# Patient Record
Sex: MALE | Race: OTHER | HISPANIC OR LATINO
[De-identification: names, ages, dates, MRNs, and addresses within clinical notes are randomized per-mention and may not be internally consistent; named-entity substitution may affect disease eponyms.]

---

## 2022-11-17 PROBLEM — Z00.00 ENCOUNTER FOR PREVENTIVE HEALTH EXAMINATION: Status: ACTIVE | Noted: 2022-11-17

## 2023-01-03 ENCOUNTER — APPOINTMENT (OUTPATIENT)
Dept: HEPATOLOGY | Facility: CLINIC | Age: 68
End: 2023-01-03

## 2023-01-24 ENCOUNTER — APPOINTMENT (OUTPATIENT)
Dept: HEPATOLOGY | Facility: CLINIC | Age: 68
End: 2023-01-24

## 2023-03-02 ENCOUNTER — APPOINTMENT (OUTPATIENT)
Dept: HEPATOLOGY | Facility: CLINIC | Age: 68
End: 2023-03-02

## 2023-03-16 ENCOUNTER — APPOINTMENT (OUTPATIENT)
Dept: HEPATOLOGY | Facility: CLINIC | Age: 68
End: 2023-03-16

## 2024-02-09 ENCOUNTER — APPOINTMENT (OUTPATIENT)
Dept: HEPATOLOGY | Facility: CLINIC | Age: 69
End: 2024-02-09

## 2024-03-05 ENCOUNTER — APPOINTMENT (OUTPATIENT)
Dept: HEPATOLOGY | Facility: CLINIC | Age: 69
End: 2024-03-05
Payer: MEDICARE

## 2024-03-05 VITALS
OXYGEN SATURATION: 97 % | RESPIRATION RATE: 14 BRPM | DIASTOLIC BLOOD PRESSURE: 68 MMHG | WEIGHT: 183 LBS | SYSTOLIC BLOOD PRESSURE: 133 MMHG | HEIGHT: 67 IN | TEMPERATURE: 98.7 F | BODY MASS INDEX: 28.72 KG/M2 | HEART RATE: 74 BPM

## 2024-03-05 DIAGNOSIS — Z87.891 PERSONAL HISTORY OF NICOTINE DEPENDENCE: ICD-10-CM

## 2024-03-05 DIAGNOSIS — Z87.898 PERSONAL HISTORY OF OTHER SPECIFIED CONDITIONS: ICD-10-CM

## 2024-03-05 DIAGNOSIS — J98.4 OTHER DISORDERS OF LUNG: ICD-10-CM

## 2024-03-05 DIAGNOSIS — E11.9 TYPE 2 DIABETES MELLITUS W/OUT COMPLICATIONS: ICD-10-CM

## 2024-03-05 PROCEDURE — 99205 OFFICE O/P NEW HI 60 MIN: CPT

## 2024-03-05 RX ORDER — GLIMEPIRIDE 4 MG/1
4 TABLET ORAL
Refills: 0 | Status: ACTIVE | COMMUNITY

## 2024-03-05 RX ORDER — METFORMIN HYDROCHLORIDE 1000 MG/1
1000 TABLET, COATED ORAL
Refills: 0 | Status: ACTIVE | COMMUNITY

## 2024-03-05 RX ORDER — SIMVASTATIN 5 MG/1
5 TABLET, FILM COATED ORAL
Refills: 0 | Status: ACTIVE | COMMUNITY

## 2024-03-05 RX ORDER — LISINOPRIL 5 MG/1
5 TABLET ORAL
Refills: 0 | Status: ACTIVE | COMMUNITY

## 2024-03-11 ENCOUNTER — APPOINTMENT (OUTPATIENT)
Dept: HEPATOLOGY | Facility: CLINIC | Age: 69
End: 2024-03-11
Payer: MEDICARE

## 2024-03-11 PROCEDURE — 76981 USE PARENCHYMA: CPT

## 2024-03-18 NOTE — ASSESSMENT
[FreeTextEntry1] : 69 yo Pakistani speaking, overweight (BMI28) Male from [facility], w/ Hx of Type 2 DM (Dx 2022, on OAD), HLD (simvastatin), uncertain lung disease ("allergy"), pos KARISHMA and RF, thrombocytopenia and cirrhosis presumably due to AUD (age [age], quit around 2019), w/ CSPH, but without any prior decompensation per patient.   Cirrhosis without any signs or symptoms of decompensation. - Will get MELD labs - I have explained and educated pt at length the natural history of cirrhosis, complications of cirrhosis with portal hypertension, esophageal varices with bleeding, portosystemic encephalopathy, ascites and infection, and  risk of HCC. - No ascites on physical exam and on US abdomen, no hx of SBP in past , not on SBP prophylaxis, not on diuretics. Normal BUN/Cr. - No signs of overt hepatic encephalopathy. - Esophageal varix (EV) screening: Will get CBC, LSM, but likely will need EV screening - HCC screening: Ordered AFP and US abd.  - Ordered CLD BW.  Health maintenance: - Will check Hep serologies and will address bone health next visit - Counseled on liver healthy diet, including but not limited to avoiding alcohol, illicit drug, avoiding eating any unpasteurized dairy products; avoiding eating any raw or undercooked eggs, fish, poultry, or meat; and avoiding eating raw/steamed oysters or other shellfish to avoid risk of Vibrio infection.  Advised on avoiding use of herbal and dietary supplements due to potential hepatotoxicity, and limit use of acetaminophen to <2 grams per day. Advised on avoiding use of nonsteroidal antiinflammatory drugs (NSAIDs) as these can precipitate renal dysfunction in patients with advanced liver disease.    RTC 3 weeks w/ above resulrs

## 2024-03-18 NOTE — HISTORY OF PRESENT ILLNESS
[Alcohol Abuse] : alcohol abuse [Travel to Endemic Area] : travel to an endemic area [IV Drug Use] : no IV drug use [Infected Sexual Partner] : no infected sexual partner [Body Piercing] : no body piercing [Tattoo] : no tattoos [Hemodialysis] : no hemodialysis [Transfusion before 1992] : no transfusion before 1992 [Transplant before 1992] : no transplant before 1992 [Household Contact to HBV] : no household contact to HBV [Cocaine Use] : no cocaine use [de-identified] : Born in [facility]. Last travel 2/2024 [FreeTextEntry1] : Patient was offered free  services in her preferred language,  ID [id number].  69 yo Kyrgyz speaking Male w/ Hx of Type 2 DM (Dx 2022, on OAD, on metformin 1000 mg bid, glimepiride 4 mg/d, lisinopril 2.5 mg), HLD (simvastatin 5 mg), uncertain lung disease ("allergy", using albuterol, azelastine, fluticasone), was referred to hematology by PCP for thrombocytopenia (PLT ~ 100) noted in 2022, [facility] and was referred to liver clinic for cirrhosis, US abd 5/2022 showing enlarged, nodular, heterogenous liver w/ mild splenomegaly suggested cirrhosis w/ CSPH.  His cirrhosis suspected to be due to alcohol, between age 24 and 50, he has been daily drinking whiskey and marcela (10 drinks every day). He quit on his own in 2019 (2015 per A note) when he was hospitalized for liver disease. While denies prior Hx of ascites, he mentioned that his abdomen was growing when he got to the hospital and "went down" after he quit drinking.  He denies prior UGIB, no prior EGD, no prior HE.  Prior liver workup showed Hep A IgM neg, HBsAg neg, HBcAb IgM  neg, HCV ab neg, ceruloplasmin 21, IgG 1959, IgA 279, IgM 81, KARISHMA 1:640, HIV neg.  Labs 11/2023: INR 1.14, Na 139, K 4.2, AST 81, ALT 65, , bili 1.8, alb 4.1.  He had colonoscopy (~ 2022) which was reportedly c/b LGIB (?) requiring transfusion.   He is here for initial evaluation.  No recent BW or US. He had recent cold symptoms, sore throat, congestion, but had no fever or chills, took decongestant and improved.  He reports that getting injections desensitizing b/o his allergies.

## 2024-03-18 NOTE — REVIEW OF SYSTEMS
[Recent Weight Gain (___ Lbs)] : recent [unfilled] ~Ulb weight gain [Cough] : cough [Heartburn] : heartburn [As Noted in HPI] : as noted in HPI [Nocturia] : nocturia [Fever] : no fever [Chills] : no chills [Recent Weight Loss (___ Lbs)] : no recent weight loss [Chest Pain] : no chest pain [Shortness Of Breath] : no shortness of breath [Palpitations] : no palpitations [SOB on Exertion] : no shortness of breath during exertion [Abdominal Pain] : no abdominal pain [Vomiting] : no vomiting [Constipation] : no constipation [Diarrhea] : no diarrhea [Melena] : no melena [Dysuria] : no dysuria [Itching] : no itching [Confused] : no confusion [FreeTextEntry3] : wears eyes  [FreeTextEntry7] : Constipation only during his URI. No nausea. No hematochezia.   [FreeTextEntry6] : recent cold, improved   [FreeTextEntry9] : Hx of shoulder pain

## 2024-03-18 NOTE — PHYSICAL EXAM
[Spider Angioma] : No spider angioma(s) were observed [Scleral Icterus] : No Scleral Icterus [Non-Tender] : non-tender [Abdominal  Ascites] : no ascites [Jaundice] : No jaundice [Palmar Erythema] : no Palmar Erythema [Asterixis] : no asterixis observed [General Appearance - Alert] : alert [General Appearance - In No Acute Distress] : in no acute distress [Sclera] : the sclera and conjunctiva were normal [Oropharynx] : the oropharynx was normal [Neck Appearance] : the appearance of the neck was normal [Neck Cervical Mass (___cm)] : no neck mass was observed [Jugular Venous Distention Increased] : there was no jugular-venous distention [Thyroid Diffuse Enlargement] : the thyroid was not enlarged [Thyroid Nodule] : there were no palpable thyroid nodules [Auscultation Breath Sounds / Voice Sounds] : lungs were clear to auscultation bilaterally [Heart Sounds] : normal S1 and S2 [Heart Rate And Rhythm] : heart rate was normal and rhythm regular [Heart Sounds Gallop] : no gallops [Murmurs] : no murmurs [Heart Sounds Pericardial Friction Rub] : no pericardial rub [Edema] : there was no peripheral edema [Abdomen Soft] : soft [Bowel Sounds] : normal bowel sounds [] : no hepato-splenomegaly [Abdomen Tenderness] : non-tender [Abdomen Mass (___ Cm)] : no abdominal mass palpated [Cervical Lymph Nodes Enlarged Anterior Bilaterally] : anterior cervical [Cervical Lymph Nodes Enlarged Posterior Bilaterally] : posterior cervical [Axillary Lymph Nodes Enlarged Bilaterally] : axillary [Supraclavicular Lymph Nodes Enlarged Bilaterally] : supraclavicular [Femoral Lymph Nodes Enlarged Bilaterally] : femoral [Inguinal Lymph Nodes Enlarged Bilaterally] : inguinal [No Spinal Tenderness] : no spinal tenderness [No CVA Tenderness] : no ~M costovertebral angle tenderness [Abnormal Walk] : normal gait [Skin Color & Pigmentation] : normal skin color and pigmentation [FreeTextEntry1] : Grossly intact [Oriented To Time, Place, And Person] : oriented to person, place, and time [Affect] : the affect was normal [Impaired Insight] : insight and judgment were intact

## 2024-04-09 LAB
25(OH)D3 SERPL-MCNC: 62.6 NG/ML
A1AT SERPL-MCNC: 180 MG/DL
AFP-TM SERPL-MCNC: 4.7 NG/ML
ALBUMIN SERPL ELPH-MCNC: 4.2 G/DL
ALP BLD-CCNC: 210 IU/L
ALP BLD-CCNC: 215 U/L
ALP BONE CFR SERPL: 24 %
ALP BONE SERPL-MCNC: 32.6 UG/L
ALP INTEST CFR SERPL: 26 %
ALP LIVER CFR SERPL: 50 %
ALT SERPL-CCNC: 65 U/L
ANA PAT FLD IF-IMP: NORMAL
ANA SER IF-ACNC: ABNORMAL
ANION GAP SERPL CALC-SCNC: 13 MMOL/L
APTT BLD: 36.4 SEC
AST SERPL-CCNC: 86 U/L
BILIRUB DIRECT SERPL-MCNC: 0.5 MG/DL
BILIRUB INDIRECT SERPL-MCNC: 1 MG/DL
BILIRUB SERPL-MCNC: 1.5 MG/DL
BUN SERPL-MCNC: 11 MG/DL
CALCIUM SERPL-MCNC: 9.3 MG/DL
CANCER AG19-9 SERPL-ACNC: 60 U/ML
CEA SERPL-MCNC: 2.9 NG/ML
CHLORIDE SERPL-SCNC: 107 MMOL/L
CO2 SERPL-SCNC: 22 MMOL/L
CREAT SERPL-MCNC: 0.52 MG/DL
EGFR: 110 ML/MIN/1.73M2
ESTIMATED AVERAGE GLUCOSE: 143 MG/DL
FERRITIN SERPL-MCNC: 220 NG/ML
GGT SERPL-CCNC: 225 U/L
GLUCOSE SERPL-MCNC: 95 MG/DL
HBA1C MFR BLD HPLC: 6.6 %
HBV CORE IGG+IGM SER QL: NONREACTIVE
HBV SURFACE AB SER QL: NONREACTIVE
HBV SURFACE AG SER QL: NONREACTIVE
HCT VFR BLD CALC: 43.5 %
HEPATITIS A IGG ANTIBODY: REACTIVE
HGB BLD-MCNC: 14.2 G/DL
INR PPP: 1.06 RATIO
IRON SATN MFR SERPL: 40 %
IRON SERPL-MCNC: 137 UG/DL
LKM AB SER QL IF: <20.1 UNITS
MCHC RBC-ENTMCNC: 29.5 PG
MCHC RBC-ENTMCNC: 32.6 GM/DL
MCV RBC AUTO: 90.4 FL
MITOCHONDRIA AB SER IF-ACNC: NORMAL
PETH 16:0/18:1: NEGATIVE NG/ML
PETH 16:0/18:2: NEGATIVE NG/ML
PETH COMMENTS: NORMAL
PLATELET # BLD AUTO: 82 K/UL
POTASSIUM SERPL-SCNC: 4.2 MMOL/L
PROT SERPL-MCNC: 7.7 G/DL
PT BLD: 12.1 SEC
RBC # BLD: 4.81 M/UL
RBC # FLD: 14.1 %
SMOOTH MUSCLE AB SER QL IF: NORMAL
SODIUM SERPL-SCNC: 142 MMOL/L
TIBC SERPL-MCNC: 340 UG/DL
TSH SERPL-ACNC: 2.01 UIU/ML
TTG IGA SER IA-ACNC: <1.2 U/ML
TTG IGA SER-ACNC: NEGATIVE
UIBC SERPL-MCNC: 203 UG/DL
WBC # FLD AUTO: 3.63 K/UL

## 2024-05-28 ENCOUNTER — APPOINTMENT (OUTPATIENT)
Dept: HEPATOLOGY | Facility: CLINIC | Age: 69
End: 2024-05-28
Payer: MEDICARE

## 2024-05-28 ENCOUNTER — NON-APPOINTMENT (OUTPATIENT)
Age: 69
End: 2024-05-28

## 2024-05-28 VITALS
TEMPERATURE: 98.2 F | HEIGHT: 67 IN | OXYGEN SATURATION: 96 % | BODY MASS INDEX: 29.82 KG/M2 | HEART RATE: 78 BPM | RESPIRATION RATE: 14 BRPM | DIASTOLIC BLOOD PRESSURE: 68 MMHG | WEIGHT: 190 LBS | SYSTOLIC BLOOD PRESSURE: 132 MMHG

## 2024-05-28 DIAGNOSIS — E78.5 HYPERLIPIDEMIA, UNSPECIFIED: ICD-10-CM

## 2024-05-28 DIAGNOSIS — R76.8 OTHER SPECIFIED ABNORMAL IMMUNOLOGICAL FINDINGS IN SERUM: ICD-10-CM

## 2024-05-28 PROCEDURE — 99214 OFFICE O/P EST MOD 30 MIN: CPT

## 2024-05-28 RX ORDER — CARVEDILOL 3.12 MG/1
3.12 TABLET, FILM COATED ORAL TWICE DAILY
Qty: 60 | Refills: 2 | Status: ACTIVE | COMMUNITY
Start: 2024-05-28 | End: 1900-01-01

## 2024-05-30 PROBLEM — E78.5 HYPERLIPIDEMIA, UNSPECIFIED HYPERLIPIDEMIA TYPE: Status: ACTIVE | Noted: 2024-03-05

## 2024-05-30 PROBLEM — R76.8 POSITIVE ANA (ANTINUCLEAR ANTIBODY): Status: ACTIVE | Noted: 2024-03-05

## 2024-05-30 LAB
AFP-TM SERPL-MCNC: 4.8 NG/ML
ALBUMIN SERPL ELPH-MCNC: 3.8 G/DL
ALP BLD-CCNC: 175 U/L
ALT SERPL-CCNC: 44 U/L
ANION GAP SERPL CALC-SCNC: 14 MMOL/L
AST SERPL-CCNC: 57 U/L
BILIRUB DIRECT SERPL-MCNC: 0.5 MG/DL
BILIRUB INDIRECT SERPL-MCNC: 0.9 MG/DL
BILIRUB SERPL-MCNC: 1.4 MG/DL
BUN SERPL-MCNC: 11 MG/DL
CALCIUM SERPL-MCNC: 8.8 MG/DL
CANCER AG19-9 SERPL-ACNC: 54 U/ML
CEA SERPL-MCNC: 2.6 NG/ML
CHLORIDE SERPL-SCNC: 108 MMOL/L
CO2 SERPL-SCNC: 19 MMOL/L
CREAT SERPL-MCNC: 0.54 MG/DL
EGFR: 109 ML/MIN/1.73M2
GLUCOSE SERPL-MCNC: 215 MG/DL
HCT VFR BLD CALC: 39.9 %
HGB BLD-MCNC: 13.1 G/DL
INR PPP: 1.14 RATIO
MCHC RBC-ENTMCNC: 29.9 PG
MCHC RBC-ENTMCNC: 32.8 GM/DL
MCV RBC AUTO: 91.1 FL
PLATELET # BLD AUTO: NORMAL K/UL
POTASSIUM SERPL-SCNC: 3.9 MMOL/L
PROT SERPL-MCNC: 6.6 G/DL
PT BLD: 12.8 SEC
RBC # BLD: 4.38 M/UL
RBC # FLD: 14.6 %
SODIUM SERPL-SCNC: 140 MMOL/L
WBC # FLD AUTO: 3.5 K/UL

## 2024-05-30 NOTE — REVIEW OF SYSTEMS
[Recent Weight Gain (___ Lbs)] : recent [unfilled] ~Ulb weight gain [Cough] : cough [Heartburn] : heartburn [Nocturia] : nocturia [As Noted in HPI] : as noted in HPI [Fever] : no fever [Chills] : no chills [Recent Weight Loss (___ Lbs)] : no recent weight loss [Chest Pain] : no chest pain [Palpitations] : no palpitations [Shortness Of Breath] : no shortness of breath [SOB on Exertion] : no shortness of breath during exertion [Abdominal Pain] : no abdominal pain [Vomiting] : no vomiting [Constipation] : no constipation [Diarrhea] : no diarrhea [Melena] : no melena [Dysuria] : no dysuria [Itching] : no itching [Confused] : no confusion [FreeTextEntry3] : wears eyes  [FreeTextEntry6] : recent cold, improved   [FreeTextEntry7] : Constipation only during his URI. No nausea. No hematochezia.   [FreeTextEntry9] : Hx of shoulder pain

## 2024-05-30 NOTE — HISTORY OF PRESENT ILLNESS
[Alcohol Abuse] : alcohol abuse [Travel to Endemic Area] : travel to an endemic area [Infected Sexual Partner] : no infected sexual partner [IV Drug Use] : no IV drug use [Body Piercing] : no body piercing [Tattoo] : no tattoos [Hemodialysis] : no hemodialysis [Transfusion before 1992] : no transfusion before 1992 [Transplant before 1992] : no transplant before 1992 [Household Contact to HBV] : no household contact to HBV [Cocaine Use] : no cocaine use [de-identified] : Born in [facility]. Last travel 2/2024 [FreeTextEntry1] : Patient was offered free  services in her preferred language,  ID [id number] and [id number].   67 yo Icelandic speaking Male w/ Hx of Type 2 DM (Dx 2022, on OAD, on metformin 1000 mg bid, glimepiride 4 mg/d, lisinopril 2.5 mg), HLD (simvastatin 5 mg), uncertain lung disease ("allergy", using albuterol, azelastine, fluticasone), was referred to hematology by PCP for thrombocytopenia (PLT ~ 100) noted in 2022, was seen at [facility] by Dr. Blackburn and was referred to liver clinic for cirrhosis, US abd 5/2022 showing enlarged, nodular, heterogenous liver w/ mild splenomegaly suggested cirrhosis w/ CSPH.  His cirrhosis suspected to be due to alcohol, between age 24 and 50, he has been daily drinking whiskey and marcela (10 drinks every day). He quit on his own in 2019 (2015 per A note) when he was hospitalized for liver disease. While denies prior Hx of ascites, he mentioned that his abdomen was growing when he got to the hospital and "went down" after he quit drinking.  He denies prior UGIB, no prior EGD, no prior HE.  Prior liver workup showed Hep A IgM neg, HBsAg neg, HBcAb IgM  neg, HCV ab neg, ceruloplasmin 21, IgG 1959, IgA 279, IgM 81, KARISHMA 1:640, HIV neg.  Labs 11/2023: INR 1.14, Na 139, K 4.2, AST 81, ALT 65, , bili 1.8, alb 4.1.  He had colonoscopy (~ 2022) which was reportedly c/b LGIB (?) requiring transfusion.   He was seen for initial evaluation 3/5/24 when had no recent BW or US. He had recent cold symptoms, sore throat, congestion, but had no fever or chills, took decongestant and improved.  He reported that getting injections desensitizing b/o his allergies.   He is here for follow up. No change in medications. No new symptoms.  Fibroscan 3/11/24 showed no steatosis, but  LSM  was > 25 kPA in line w/ cirrhosis w/ CSPH.

## 2024-05-30 NOTE — ASSESSMENT
[FreeTextEntry1] : 69 yo Icelandic speaking, overweight (BMI28) Male from [facility], w/ Hx of Type 2 DM (Dx 2022, on OAD), HLD (simvastatin), uncertain lung disease ("allergy"), pos KARISHMA and RF, thrombocytopenia and cirrhosis presumably due to AUD (age [age], quit around 2019), w/ CSPH, but without any prior decompensation per patient.   Cirrhosis without any signs or symptoms of decompensation., MELD 3.0 8 - Will get repeat MELD labs - I have explained and educated pt at length the natural history of cirrhosis, complications of cirrhosis with portal hypertension, esophageal varices with bleeding, portosystemic encephalopathy, ascites and infection, and  risk of HCC. - No ascites on physical exam and on US abdomen, no hx of SBP in past, not on SBP prophylaxis, not on diuretics. Normal BUN/Cr. - No signs of overt hepatic encephalopathy. - Esophageal varix (EV) screening: LSM > 25 kPa suggests CSPH, thus starting on carvedilol 3.125 mg bid, and referred to GI for EV screening - HCC screening q 6 months: No focal hepatic lesion on US abd 3/25/24, AFP WNL 3/2024. Will repeat 9/2024. (B/o GB polyps, will get MR/MRCP now.) - Reviewed CLD BW. KARISHMA pos but transaminases close to normal thus active AIH less likely, will monitor, get Ig level and refer to rheumatology for pos KARISHMA  Health maintenance: -  Hep A immune - Hep B neg, not immune, not exposed - advised Hep B vaccine, patient to schedule via  or  via PCP - Will address bone health next visit - Counseled on liver healthy diet, including but not limited to avoiding alcohol, illicit drug, avoiding eating any unpasteurized dairy products; avoiding eating any raw or undercooked eggs, fish, poultry, or meat; and avoiding eating raw/steamed oysters or other shellfish to avoid risk of Vibrio infection.  Advised on avoiding use of herbal and dietary supplements due to potential hepatotoxicity, and limit use of acetaminophen to <2 grams per day. Advised on avoiding use of nonsteroidal anti-inflammatory drugs (NSAIDs) as these can precipitate renal dysfunction in patients with advanced liver disease.    RTC after MRCP (3-4 weeks latest)

## 2024-05-30 NOTE — PHYSICAL EXAM
[Non-Tender] : non-tender [General Appearance - Alert] : alert [General Appearance - In No Acute Distress] : in no acute distress [Sclera] : the sclera and conjunctiva were normal [Oropharynx] : the oropharynx was normal [Neck Appearance] : the appearance of the neck was normal [Neck Cervical Mass (___cm)] : no neck mass was observed [Jugular Venous Distention Increased] : there was no jugular-venous distention [Thyroid Diffuse Enlargement] : the thyroid was not enlarged [Thyroid Nodule] : there were no palpable thyroid nodules [Auscultation Breath Sounds / Voice Sounds] : lungs were clear to auscultation bilaterally [Heart Rate And Rhythm] : heart rate was normal and rhythm regular [Heart Sounds] : normal S1 and S2 [Heart Sounds Gallop] : no gallops [Murmurs] : no murmurs [Heart Sounds Pericardial Friction Rub] : no pericardial rub [Edema] : there was no peripheral edema [Bowel Sounds] : normal bowel sounds [Abdomen Soft] : soft [Abdomen Tenderness] : non-tender [] : no hepato-splenomegaly [Abdomen Mass (___ Cm)] : no abdominal mass palpated [Cervical Lymph Nodes Enlarged Posterior Bilaterally] : posterior cervical [Cervical Lymph Nodes Enlarged Anterior Bilaterally] : anterior cervical [Supraclavicular Lymph Nodes Enlarged Bilaterally] : supraclavicular [Axillary Lymph Nodes Enlarged Bilaterally] : axillary [Femoral Lymph Nodes Enlarged Bilaterally] : femoral [Inguinal Lymph Nodes Enlarged Bilaterally] : inguinal [No CVA Tenderness] : no ~M costovertebral angle tenderness [No Spinal Tenderness] : no spinal tenderness [Abnormal Walk] : normal gait [Skin Color & Pigmentation] : normal skin color and pigmentation [Oriented To Time, Place, And Person] : oriented to person, place, and time [Impaired Insight] : insight and judgment were intact [Affect] : the affect was normal [Scleral Icterus] : No Scleral Icterus [Spider Angioma] : No spider angioma(s) were observed [Abdominal  Ascites] : no ascites [Asterixis] : no asterixis observed [Jaundice] : No jaundice [Palmar Erythema] : no Palmar Erythema [FreeTextEntry1] : Grossly intact

## 2024-06-05 ENCOUNTER — APPOINTMENT (OUTPATIENT)
Dept: GASTROENTEROLOGY | Facility: CLINIC | Age: 69
End: 2024-06-05
Payer: MEDICARE

## 2024-06-05 VITALS
HEART RATE: 63 BPM | OXYGEN SATURATION: 95 % | BODY MASS INDEX: 29.03 KG/M2 | DIASTOLIC BLOOD PRESSURE: 70 MMHG | HEIGHT: 67 IN | SYSTOLIC BLOOD PRESSURE: 129 MMHG | WEIGHT: 185 LBS | TEMPERATURE: 97.3 F

## 2024-06-05 DIAGNOSIS — R10.13 EPIGASTRIC PAIN: ICD-10-CM

## 2024-06-05 DIAGNOSIS — K82.4 CHOLESTEROLOSIS OF GALLBLADDER: ICD-10-CM

## 2024-06-05 DIAGNOSIS — Z86.010 PERSONAL HISTORY OF COLONIC POLYPS: ICD-10-CM

## 2024-06-05 DIAGNOSIS — K74.60 UNSPECIFIED CIRRHOSIS OF LIVER: ICD-10-CM

## 2024-06-05 DIAGNOSIS — K21.9 GASTRO-ESOPHAGEAL REFLUX DISEASE W/OUT ESOPHAGITIS: ICD-10-CM

## 2024-06-05 DIAGNOSIS — D69.6 THROMBOCYTOPENIA, UNSPECIFIED: ICD-10-CM

## 2024-06-05 DIAGNOSIS — R19.7 DIARRHEA, UNSPECIFIED: ICD-10-CM

## 2024-06-05 PROCEDURE — 99204 OFFICE O/P NEW MOD 45 MIN: CPT

## 2024-06-05 RX ORDER — SIMETHICONE 125 MG/1
125 TABLET, CHEWABLE ORAL
Qty: 120 | Refills: 3 | Status: ACTIVE | COMMUNITY
Start: 2024-06-05 | End: 1900-01-01

## 2024-06-05 RX ORDER — FAMOTIDINE 40 MG/1
40 TABLET, FILM COATED ORAL
Qty: 60 | Refills: 3 | Status: ACTIVE | COMMUNITY
Start: 2024-06-05 | End: 1900-01-01

## 2024-06-06 NOTE — HISTORY OF PRESENT ILLNESS
[FreeTextEntry1] : The patient is a 68-year-old  male with past medical history significant for hypertension, hypercholesterolemia, diabetes mellitus and ETOH liver cirrhosis being followed by his hepatologist, Dr. Jaci Mendoza who was referred to my office by Dr. Ezra Mckeon for evaluation for esophageal varices. The patient also admits to having dyspepsia, gastroesophageal reflux disease, diarrhea, change in bowel habits and change in caliber of stool.  The patient also admits to a history of colonic polyps.  I was asked to render an opinion for consultation for the above complaints.   The patient states that he is feeling fine.  The patient has a history of liver disease.  The patient has a history of fatty liver noted on prior imaging study. The patient denies any prior exposure to hepatitis A, B or C.  The patient denies any large doses of nonsteroidal anti-inflammatory drugs or acetaminophen.   The patient denies sharing needles, razors, nail clippers, nail files, scissors, et cetera.  The patient admits to EtOH abuse but denies any cocaine use and intravenous drug use.  The patient denies any prior sexual indiscretions, tattoos or piercing.  The patient admits to having prior surgery.  The history of surgery is significant for a prior bilateral hernia repair.  The patient admits to a prior blood transfusion at [facility] after colon polypectomy in the past in 2020.  The patient denies any family history of GI or liver problems.  The patient denies any abdominal pain.  The patient complains of abdominal gas and bloating.  The patient denies any nausea or vomiting.  The patient complains of occasional gastroesophageal reflux disease but denies any dysphagia. The gastroesophageal reflux disease is worse after meals and late at night and in the early morning.  The patient denies taking medications for the gastroesophageal reflux disease. The patient denies any atypical chest pain, shortness of breath or palpitations.  The patient denies any diaphoresis. The patient complains of occasional diarrhea but denies any constipation.  The patient has 1 to 2 bowel movements a day. The diarrhea is described as soft to watery in nature.   The patient complains of a change in bowel habits.  The patient complains of a change in caliber of stool.  The patient denies having mucus discharge with the bowel movements.  The patient denies any bright red blood per rectum, melena or hematemesis.  The patient denies any rectal pain or rectal pruritus. The patient denies any weight loss or anorexia.  He denies any fevers or chills.  The patient denies any jaundice or pruritus.  The patient denies any back pain. The blood work performed on May 28, 2024 revealed a low WBC count of 3.50 K/UL, no evidence of anemia with a hemoglobin/hematocrit level of 13.1/39.9, respectively, platelets clumped, a normal PT/INR of 12.8/1.14, respectively, a normal alpha-fetoprotein level of 4.8 ng/mL, a normal CEA level of 2.6 ng/mL, and elevated CA 19-9 of 54 U/mL, elevated liver enzymes with an alkaline phosphatase/AST of  175/57 U/L, respectively, and elevated total bilirubin of 1.4 mg/dL, a normal ALT of 44 U/L, a low CO2 of 19 mmol/L, and elevated blood glucose of 215 mg/dL.  The platelet count automated was 86,000.  The abdominal ultrasound performed on March 25, 2024 revealed cirrhotic liver, mildly contracted gallbladder containing few small gallbladder polyps, and enlarged spleen small amount of free fluid seen adjacent to the liver. The patient admits to having a prior colonoscopy performed by another gastroenterologist.  According to the patient, the colonoscopic findings revealed colon polyps.   The patient denies any significant family history of GI problems.    (+) prior smoking 1 PPW x 30 years stopped x 4 years, (+) prior ETOH x 4 years, (-) IVDA  Physical Exam: General Appearance: Overweight, no acute distress ENT:  nose clear, ears unremarkable Eyes: No enteric sclera, conjunctiva clear. Neck: Supple, without masses  Respiratory: Breath sounds equal and bilateral, no wheezing no rales or rhonchi Cardiovascular: S1-S2 audible, no murmur, no rubs or gallops GI: (+) BS, soft, nontender, no rebound, no guarding, no masses Liver: liver edge palpated Rectal: not done Musculo-skeletal: Good motor strength, good range of motion, normal appearing extremities Skin: Normal appearing skin, no jaundice, no rashes or nodules Neurological: without focal motor or sensory deficits Patient is moving all extremities spontaneously and to command with normal muscle strength alert and oriented X3 Psychiatric: Good affect, not depressed, not anxious    [de-identified] : The abdominal ultrasound performed on March 25, 2024 revealed cirrhotic liver, mildly contracted gallbladder containing few small gallbladder polyps, and enlarged spleen small amount of free fluid seen adjacent to the liver.

## 2024-06-06 NOTE — ASSESSMENT
[FreeTextEntry1] : Dyspepsia: The patient complains of dyspeptic symptoms.  The patient was advised to abide by an anti-gas (low FOD-MAP) diet.  The patient was given a pamphlet for anti-gas (low FOD-MAP).  The patient and I reviewed the anti-gas (low FOD-MAP) diet at length. The patient is to start on a trial of Simethicone one tablet 4 times a day p.r.n. abdominal pain and gas. GERD: The patient was advised to avoid late-night meals and dietary indiscretions.  The patient was advised to avoid fried and fatty foods.  The patient was advised to abide by an anti-GERD diet. The patient was given a pamphlet for anti-GERD.  The patient and I reviewed the anti-GERD diet at length. I recommend a trial of famotidine 40 mg twice a day x 3 months for the symptoms. Diarrhea: The patient complains of diarrhea.  I recommend a low residue diet. The patient is to avoid fiber supplementation. The patient is to consider starting a trial of a probiotic such as Align once a day.  re.  The patient was told of the risks of perforation, emergency surgery, bleeding, infections and missed lesions.  The patient agreed and will follow-up to reassess the symptoms.   Prior Endoscopic Procedures: The patient had a prior colonoscopy performed by another gastroenterologist.  I will try to obtain the prior colonoscopy reports.  The patient is to sign a record release to obtain those records. ETOH Liver Cirrhosis: The patient had a history of ETOH abuse.  The patient denies any further alcohol use.  The patient denies any jaundice or pruritus. The patient denies taking large doses of nonsteroidal anti-inflammatory drugs or acetaminophen.  The findings are secondary to EtOH liver cirrhosis. The patient was told of the possible increased risk of developing liver failure, worsening cirrhosis, ascites, GI bleeding secondary to varices, hepatic encephalopathy, bleeding tendencies and liver cancer.  The patient was told of the importance of follow-up.  The patient was advised to follow up every 6 months for blood work and imaging studies. The patient agreed and will follow up. The patient was also advised to avoid NSAIDs, Acetaminophen and any other hepatotoxic drugs. The patient was also advised not to share needles, razors, scissors, nail clippers, etc.. The patient is to continue close follow-up in our office for blood work and exams.  The patient and I had a long discussion regarding the importance of abstinence of alcohol because of the risk of cirrhosis and complications. The patient is aware of the risks of further progression of liver disease while drinking alcohol or taking hepato-toxic agents.  The patient agrees and will continue to abstain from alcohol.  The patient is to continue followup with his hepatologist Dr. Mendoza. R/O Esophageal Varices: The patient was found to have findings on imaging study was suggestive of liver cirrhosis. I recommend an upper endoscopy to assess for esophageal varices and possible esophageal band ligation.  The patient was told of the increased risk of esophageal variceal bleeding in the future.  I recommend an upper endoscopy to assess the esophageal varices for possible band ligation.  The patient was told of the risks and benefits of the procedure.  The patient was told of the risks of perforation, emergency surgery, bleeding, infections and missed lesions. The patient agreed and will schedule for procedure. The patient is to be n.p.o. after midnight.  I also recommend a prophylactic dose of Cipro 500 mg one hour prior to the procedure and 6 hours after the procedure.  The patient is to return for the procedure.  Gallbladder Polyps: The patient has gallbladder polyps noted on abdominal ultrasound.  The patient and I had a long discussion regarding the risks of gallbladder polyps progressing to gallbladder cancer if the size is greater than 1cm.  The patient was told of the importance for follow-up for serial abdominal ultrasounds.  The patient was told of the possible need for a cholecystectomy if the gallbladder polyps are greater than 1cm.  The patient agreed and will follow-up.  I recommend a repeat abdominal ultrasound in 6 months to reassess the gallbladder polyp size. History of Colonic Polyps: The patient has a history of colonic polyps.  I recommend a repeat colonoscopy in 1 year to reassess for colonic polyps unless symptomatic.  The patient agreed and will follow up for the procedure.  Upper Endoscopy: I recommend an upper endoscopy to assess for peptic ulcer disease versus esophagitis.  The patient was told of the risks and benefits of the procedure.  The patient was told of the risks of perforation, emergency surgery, bleeding, infections and missed lesions. The patient is told not to drive, drink alcohol, use recreational drugs, exercise, or work the day of the procedure.  The patient was told of the need for an escort to accompany the patient home after the procedure. The patient is aware that the procedure may be cancelled if they fail to follow the directions.  The patient agreed and will schedule for the procedure. The patient can take the antihypertensive medication with a sip of water one hour prior to the procedure. The patient is to hold the diabetic medication the day the morning of the procedure. The patient is to be n.p.o. after midnight.  The patient is to return for the procedure. Thrombocytopenia: The patient was found to have thrombocytopenia on recent blood work.  The thrombocytopenia is most likely secondary to cirrhosis and splenomegaly.  The patient denies any active GI bleeding.  I recommend  following the platelet count and treat accordingly. Follow-up: The patient is to follow-up in the office in 4 weeks to reassess the symptoms. The patient was told to call the office if any further problems.

## 2024-06-11 LAB
DEPRECATED KAPPA LC FREE/LAMBDA SER: 1.17 RATIO
IGA SER QL IEP: 256 MG/DL
IGG SER QL IEP: 1575 MG/DL
IGM SER QL IEP: 88 MG/DL
KAPPA LC CSF-MCNC: 2.45 MG/DL
KAPPA LC SERPL-MCNC: 2.87 MG/DL
PETH 16:0/18:1: NEGATIVE NG/ML
PETH 16:0/18:2: NEGATIVE NG/ML
PETH COMMENTS: NORMAL
PLATELET # BLD AUTO: 86 K/UL
PLATELET # PLAS AUTO: NORMAL

## 2024-06-19 ENCOUNTER — APPOINTMENT (OUTPATIENT)
Dept: MRI IMAGING | Facility: CLINIC | Age: 69
End: 2024-06-19

## 2024-09-02 ENCOUNTER — RX RENEWAL (OUTPATIENT)
Age: 69
End: 2024-09-02

## 2024-09-17 ENCOUNTER — APPOINTMENT (OUTPATIENT)
Dept: GASTROENTEROLOGY | Facility: HOSPITAL | Age: 69
End: 2024-09-17

## 2024-10-16 ENCOUNTER — APPOINTMENT (OUTPATIENT)
Dept: GASTROENTEROLOGY | Facility: CLINIC | Age: 69
End: 2024-10-16

## 2024-10-22 ENCOUNTER — OUTPATIENT (OUTPATIENT)
Dept: OUTPATIENT SERVICES | Facility: HOSPITAL | Age: 69
LOS: 1 days | End: 2024-10-22
Payer: COMMERCIAL

## 2024-10-22 ENCOUNTER — TRANSCRIPTION ENCOUNTER (OUTPATIENT)
Age: 69
End: 2024-10-22

## 2024-10-22 ENCOUNTER — APPOINTMENT (OUTPATIENT)
Dept: GASTROENTEROLOGY | Facility: HOSPITAL | Age: 69
End: 2024-10-22

## 2024-10-22 VITALS
TEMPERATURE: 98 F | RESPIRATION RATE: 12 BRPM | SYSTOLIC BLOOD PRESSURE: 125 MMHG | HEIGHT: 67 IN | DIASTOLIC BLOOD PRESSURE: 60 MMHG | OXYGEN SATURATION: 98 % | HEART RATE: 58 BPM | WEIGHT: 184.97 LBS

## 2024-10-22 VITALS
DIASTOLIC BLOOD PRESSURE: 76 MMHG | HEART RATE: 71 BPM | SYSTOLIC BLOOD PRESSURE: 137 MMHG | RESPIRATION RATE: 22 BRPM | OXYGEN SATURATION: 96 %

## 2024-10-22 DIAGNOSIS — K74.60 UNSPECIFIED CIRRHOSIS OF LIVER: ICD-10-CM

## 2024-10-22 DIAGNOSIS — Z98.890 OTHER SPECIFIED POSTPROCEDURAL STATES: Chronic | ICD-10-CM

## 2024-10-22 LAB — GLUCOSE BLDC GLUCOMTR-MCNC: 151 MG/DL — HIGH (ref 70–99)

## 2024-10-22 PROCEDURE — C1889: CPT

## 2024-10-22 PROCEDURE — 43244 EGD VARICES LIGATION: CPT

## 2024-10-22 PROCEDURE — 43235 EGD DIAGNOSTIC BRUSH WASH: CPT

## 2024-10-22 PROCEDURE — 82962 GLUCOSE BLOOD TEST: CPT

## 2024-10-22 DEVICE — SPEEDBAND SPRVW 7: Type: IMPLANTABLE DEVICE | Status: FUNCTIONAL

## 2024-10-22 RX ORDER — SODIUM CHLORIDE 9 MG/ML
500 INJECTION, SOLUTION INTRAMUSCULAR; INTRAVENOUS; SUBCUTANEOUS
Refills: 0 | Status: COMPLETED | OUTPATIENT
Start: 2024-10-22 | End: 2024-10-22

## 2024-10-22 RX ADMIN — SODIUM CHLORIDE 30 MILLILITER(S): 9 INJECTION, SOLUTION INTRAMUSCULAR; INTRAVENOUS; SUBCUTANEOUS at 11:27

## 2024-10-22 NOTE — CHART NOTE - NSCHARTNOTEFT_GEN_A_CORE
History of Present Illness       The patient is a 69-year-old  male with past medical history significant for hypertension, hypercholesterolemia, diabetes mellitus and ETOH liver cirrhosis being followed by his hepatologist, Dr. Jaci Mendoza who was referred to my office by Dr. Ezra Mckeon for evaluation for esophageal varices. The patient also admits to having dyspepsia, gastroesophageal reflux disease, diarrhea, change in bowel habits and change in caliber of stool. The patient also admits to a history of colonic polyps  I was asked to render an opinion for consultation for the above complaints. The patient states that he is feeling fine. The patient has a history of liver disease. The patient has a history of fatty liver noted on prior imaging study. The patient denies any prior exposure to hepatitis A, B or C. The patient denies any large doses of nonsteroidal anti-inflammatory drugs or acetaminophen. The patient denies sharing needles, razors, nail clippers, nail files, scissors, et cetera. The patient admits to EtOH abuse but denies any cocaine use and intravenous drug use. The patient denies any prior sexual indiscretions, tattoos or piercing. The patient admits to having prior surgery. The history of surgery is significant for a prior bilateral hernia repair. The patient admits to a prior blood transfusion at [facility] after colon polypectomy in the past in 2020. The patient denies any family history of GI or liver problems. The patient denies any abdominal pain. The patient complains of abdominal gas and bloating. The patient denies any nausea or vomiting. The patient complains of occasional gastroesophageal reflux disease but denies any dysphagia. The gastroesophageal reflux disease is worse after meals and late at night and in the early morning. The patient denies taking medications for the gastroesophageal reflux disease. The patient denies any atypical chest pain, shortness of breath or palpitations. The patient denies any diaphoresis. The patient complains of occasional diarrhea but denies any constipation. The patient has 1 to 2 bowel movements a day. The diarrhea is described as soft to watery in nature. The patient complains of a change in bowel habits. The patient complains of a change in caliber of stool. The patient denies having mucus discharge with the bowel movements. The patient denies any bright red blood per rectum, melena or hematemesis. The patient denies any rectal pain or rectal pruritus. The patient denies any weight loss or anorexia. He denies any fevers or chills. The patient denies any jaundice or pruritus. The patient denies any back pain. The blood work performed on May 28, 2024 revealed a low WBC count of 3.50 K/UL, no evidence of anemia with a hemoglobin/hematocrit level of 13.1/39.9, respectively, platelets clumped, a normal PT/INR of 12.8/1.14, respectively, a normal alpha-fetoprotein level of 4.8 ng/mL, a normal CEA level of 2.6 ng/mL, and elevated CA 19-9 of 54 U/mL, elevated liver enzymes with an alkaline phosphatase/AST of 175/57 U/L, respectively, and elevated total bilirubin of 1.4 mg/dL, a normal ALT of 44 U/L, a low CO2 of 19 mmol/L, and elevated blood glucose of 215 mg/dL. The platelet count automated was 86,000. The abdominal ultrasound performed on March 25, 2024 revealed cirrhotic liver, mildly contracted gallbladder containing few small gallbladder polyps, and enlarged spleen small amount of free fluid seen adjacent to the liver. The patient admits to having a prior colonoscopy performed by another gastroenterologist. According to the patient, the colonoscopic findings revealed colon polyps. The patient denies any significant family history of GI problems.  ?  (+) prior smoking 1 PPW x 30 years stopped x 4 years, (+) prior ETOH x 4 years, (-) IVDA  ?  Physical Exam:  General Appearance: Overweight, no acute distress  ENT: nose clear, ears unremarkable  Eyes: No enteric sclera, conjunctiva clear.  Neck: Supple, without masses  Respiratory: Breath sounds equal and bilateral, no wheezing no rales or rhonchi  Cardiovascular: S1-S2 audible, no murmur, no rubs or gallops  GI: (+) BS, soft, nontender, no rebound, no guarding, no masses  Liver: liver edge palpated  Rectal: not done  Musculo-skeletal: Good motor strength, good range of motion, normal appearing extremities  Skin: Normal appearing skin, no jaundice, no rashes or nodules  Neurological: without focal motor or sensory deficits Patient is moving all extremities spontaneously and to command with normal muscle strength alert and oriented X3  Psychiatric: Good affect, not depressed, not anxious      Radiology Summary    Abdominal Sono: The abdominal ultrasound performed on March 25, 2024 revealed cirrhotic liver, mildly contracted gallbladder containing few small gallbladder polyps, and enlarged spleen small amount of free fluid seen adjacent to the liver.  ?  Active Problems  Gall bladder polyp (575.6) (K82.4)  Hepatic cirrhosis, unspecified hepatic cirrhosis type, unspecified whether ascites  present (571.5) (K74.60)  Hyperlipidemia, unspecified hyperlipidemia type (272.4) (E78.5)  Lung disease (518.89) (J98.4)  Positive KARISHMA (antinuclear antibody) (795.79) (R76.8)  Thrombocytopenia (287.5) (D69.6)  Type 2 diabetes mellitus without complication, without long-term current use of insulin  (250.00) (E11.9)           ?  Social History  Former smoker (V15.82) (Z87.891)  History of alcohol use (V11.3) (Z87.898)           ?  Current Meds  Carvedilol 3.125 MG Oral Tablet; TAKE 1 TABLET TWICE DAILY WITH MEALS  Glimepiride 4 MG Oral Tablet  Lisinopril 5 MG Oral Tablet; TAKE 0.5 TABLET  metFORMIN HCl - 1000 MG Oral Tablet  Simvastatin 5 MG Oral Tablet           Allergies  No Known Drug Allergies           ?  Vitals  Vital Signs  ?  Recorded: 05Jun2024 08:50AM  Systolic  129, LUE, Sitting  Diastolic  70, LUE, Sitting  Height  5 ft 7 in  Weight  185 lb  BMI Calculated  28.98 kg/m2  BSA Calculated  1.96 m2  Temperature  97.3 F, Temporal  Heart Rate  63  O2 Saturation  95 %, Room Air  FiO2 Flow Rate  0 L/min, Room Air           ?  Assessment  Hepatic cirrhosis, unspecified hepatic cirrhosis type, unspecified whether ascites  present (571.5) (K74.60)  Thrombocytopenia (287.5) (D69.6)  History of colon polyps (V12.72) (Z86.010)  GERD without esophagitis (530.81) (K21.9)  Diarrhea, unspecified type (787.91) (R19.7)  Dyspepsia (536.8) (R10.13)  Gall bladder polyp (575.6) (K82.4)    Dyspepsia: The patient complains of dyspeptic symptoms. The patient was advised to abide by an anti-gas (low FOD-MAP) diet. The patient was given a pamphlet for anti-gas (low FOD-MAP). The patient and I reviewed the anti-gas (low FOD-MAP) diet at length. The patient is to start on a trial of Simethicone one tablet 4 times a day p.r.n. abdominal pain and gas.  GERD: The patient was advised to avoid late-night meals and dietary indiscretions. The patient was advised to avoid fried and fatty foods. The patient was advised to abide by an anti-GERD diet. The patient was given a pamphlet for anti-GERD. The patient and I reviewed the anti-GERD diet at length. I recommend a trial of famotidine 40 mg twice a day x 3 months for the symptoms.  Diarrhea: The patient complains of diarrhea. I recommend a low residue diet. The patient is to avoid fiber supplementation. The patient is to consider starting a trial of a probiotic such as Align once a day. re. The patient was told of the risks of perforation, emergency surgery, bleeding, infections and missed lesions. The patient agreed and will follow-up to reassess the symptoms.  Prior Endoscopic Procedures: The patient had a prior colonoscopy performed by another gastroenterologist. I will try to obtain the prior colonoscopy reports. The patient is to sign a record release to obtain those records.  ETOH Liver Cirrhosis: The patient had a history of ETOH abuse. The patient denies any further alcohol use. The patient denies any jaundice or pruritus. The patient denies taking large doses of nonsteroidal anti-inflammatory drugs or acetaminophen. The findings are secondary to EtOH liver cirrhosis. The patient was told of the possible increased risk of developing liver failure, worsening cirrhosis, ascites, GI bleeding secondary to varices, hepatic encephalopathy, bleeding tendencies and liver cancer. The patient was told of the importance of follow-up. The patient was advised to follow up every 6 months for blood work and imaging studies. The patient agreed and will follow up. The patient was also advised to avoid NSAIDs, Acetaminophen and any other hepatotoxic drugs. The patient was also advised not to share needles, razors, scissors, nail clippers, etc.. The patient is to continue close follow-up in our office for blood work and exams. The patient and I had a long discussion regarding the importance of abstinence of alcohol because of the risk of cirrhosis and complications. The patient is aware of the risks of further progression of liver disease while drinking alcohol or taking hepato-toxic agents. The patient agrees and will continue to abstain from alcohol. The patient is to continue followup with his hepatologist Dr. Mendoza.  R/O Esophageal Varices: The patient was found to have findings on imaging study was suggestive of liver cirrhosis. I recommend an upper endoscopy to assess for esophageal varices and possible esophageal band ligation. The patient was told of the increased risk of esophageal variceal bleeding in the future. I recommend an upper endoscopy to assess the esophageal varices for possible band ligation. The patient was told of the risks and benefits of the procedure. The patient was told of the risks of perforation, emergency surgery, bleeding, infections and missed lesions. The patient agreed and will schedule for procedure. The patient is to be n.p.o. after midnight. I also recommend a prophylactic dose of Cipro 500 mg one hour prior to the procedure and 6 hours after the procedure. The patient is to return for the procedure.  Gallbladder Polyps: The patient has gallbladder polyps noted on abdominal ultrasound. The patient and I had a long discussion regarding the risks of gallbladder polyps progressing to gallbladder cancer if the size is greater than 1cm. The patient was told of the importance for follow-up for serial abdominal ultrasounds. The patient was told of the possible need for a cholecystectomy if the gallbladder polyps are greater than 1cm. The patient agreed and will follow-up. I recommend a repeat abdominal ultrasound in 6 months to reassess the gallbladder polyp size.  History of Colonic Polyps: The patient has a history of colonic polyps. I recommend a repeat colonoscopy in 1 year to reassess for colonic polyps unless symptomatic. The patient agreed and will follow up for the procedure.  Upper Endoscopy: I recommend an upper endoscopy to assess for peptic ulcer disease versus esophagitis. The patient was told of the risks and benefits of the procedure. The patient was told of the risks of perforation, emergency surgery, bleeding, infections and missed lesions. The patient is told not to drive, drink alcohol, use recreational drugs, exercise, or work the day of the procedure. The patient was told of the need for an escort to accompany the patient home after the procedure. The patient is aware that the procedure may be cancelled if they fail to follow the directions. The patient agreed and will schedule for the procedure. The patient can take the antihypertensive medication with a sip of water one hour prior to the procedure. The patient is to hold the diabetic medication the day the morning of the procedure. The patient is to be n.p.o. after midnight. The patient is to return for the procedure.  Thrombocytopenia: The patient was found to have thrombocytopenia on recent blood work. The thrombocytopenia is most likely secondary to cirrhosis and splenomegaly. The patient denies any active GI bleeding. I recommend following the platelet count and treat accordingly.  Follow-up: The patient is to follow-up in the office in 4 weeks to reassess the symptoms. The patient was told to call the office if any further problems.            ?  Plan  Dyspepsia  Start: Simethicone 125 MG Oral Tablet Chewable; CHEW AND SWALLOW 1 TABLET 4  TIMES DAILY, AFTER MEALS AND AT BEDTIME  GERD without esophagitis  Start: Famotidine 40 MG Oral Tablet; Take 1 tablet twice daily  Hepatic cirrhosis, unspecified hepatic cirrhosis type, unspecified whether ascites  present  Upper GI Endoscopy; Status:Active; Requested for:[id number];           ?

## 2024-10-22 NOTE — ASU PATIENT PROFILE, ADULT - NSICDXPASTMEDICALHX_GEN_ALL_CORE_FT
PAST MEDICAL HISTORY:  Diabetes     ETOH abuse     Hypercholesteremia     Hypertension     Liver cirrhosis

## 2024-10-22 NOTE — ASU PATIENT PROFILE, ADULT - PRO ARRIVE FROM
Please notify parent of normal EKG result confirming that patient has a functional or benign murmur.  home

## 2024-10-22 NOTE — ASU DISCHARGE PLAN (ADULT/PEDIATRIC) - FINANCIAL ASSISTANCE
[facility] provides services at a reduced cost to those who are determined to be eligible through [facility]’s financial assistance program. Information regarding [facility]’s financial assistance program can be found by going to [email or web address] or by calling [phone].

## 2024-11-04 ENCOUNTER — RX RENEWAL (OUTPATIENT)
Age: 69
End: 2024-11-04

## 2025-01-06 ENCOUNTER — APPOINTMENT (OUTPATIENT)
Dept: GASTROENTEROLOGY | Facility: CLINIC | Age: 70
End: 2025-01-06

## 2025-01-06 VITALS
DIASTOLIC BLOOD PRESSURE: 74 MMHG | HEART RATE: 65 BPM | TEMPERATURE: 98.1 F | OXYGEN SATURATION: 98 % | SYSTOLIC BLOOD PRESSURE: 146 MMHG | BODY MASS INDEX: 29.03 KG/M2 | HEIGHT: 67 IN | WEIGHT: 185 LBS

## 2025-01-06 DIAGNOSIS — K74.60 UNSPECIFIED CIRRHOSIS OF LIVER: ICD-10-CM

## 2025-01-06 DIAGNOSIS — K31.89 PORTAL HYPERTENSION: ICD-10-CM

## 2025-01-06 DIAGNOSIS — Z86.0100 PERSONAL HISTORY OF COLON POLYPS, UNSPECIFIED: ICD-10-CM

## 2025-01-06 DIAGNOSIS — K82.4 CHOLESTEROLOSIS OF GALLBLADDER: ICD-10-CM

## 2025-01-06 DIAGNOSIS — K76.6 PORTAL HYPERTENSION: ICD-10-CM

## 2025-01-06 DIAGNOSIS — K21.9 GASTRO-ESOPHAGEAL REFLUX DISEASE W/OUT ESOPHAGITIS: ICD-10-CM

## 2025-01-06 DIAGNOSIS — R10.13 EPIGASTRIC PAIN: ICD-10-CM

## 2025-01-06 DIAGNOSIS — I85.10 SECONDARY ESOPHAGEAL VARICES W/OUT BLEEDING: ICD-10-CM

## 2025-01-06 PROBLEM — E11.9 TYPE 2 DIABETES MELLITUS WITHOUT COMPLICATIONS: Chronic | Status: ACTIVE | Noted: 2024-10-22

## 2025-01-06 PROBLEM — I10 ESSENTIAL (PRIMARY) HYPERTENSION: Chronic | Status: ACTIVE | Noted: 2024-10-22

## 2025-01-06 PROBLEM — F10.10 ALCOHOL ABUSE, UNCOMPLICATED: Chronic | Status: ACTIVE | Noted: 2024-10-22

## 2025-01-06 PROBLEM — E78.00 PURE HYPERCHOLESTEROLEMIA, UNSPECIFIED: Chronic | Status: ACTIVE | Noted: 2024-10-22

## 2025-01-06 PROCEDURE — 99214 OFFICE O/P EST MOD 30 MIN: CPT

## 2025-01-16 ENCOUNTER — APPOINTMENT (OUTPATIENT)
Dept: GASTROENTEROLOGY | Facility: HOSPITAL | Age: 70
End: 2025-01-16

## 2025-02-24 NOTE — ASU PATIENT PROFILE, ADULT - PRO MENTAL HEALTH SX RECENT
none

## 2025-03-12 ENCOUNTER — APPOINTMENT (OUTPATIENT)
Dept: GASTROENTEROLOGY | Facility: CLINIC | Age: 70
End: 2025-03-12
Payer: MEDICARE

## 2025-03-12 VITALS
HEIGHT: 67 IN | OXYGEN SATURATION: 97 % | TEMPERATURE: 97.1 F | SYSTOLIC BLOOD PRESSURE: 119 MMHG | BODY MASS INDEX: 27 KG/M2 | WEIGHT: 172 LBS | DIASTOLIC BLOOD PRESSURE: 69 MMHG | HEART RATE: 70 BPM

## 2025-03-12 DIAGNOSIS — D69.6 THROMBOCYTOPENIA, UNSPECIFIED: ICD-10-CM

## 2025-03-12 DIAGNOSIS — R10.13 EPIGASTRIC PAIN: ICD-10-CM

## 2025-03-12 DIAGNOSIS — K82.4 CHOLESTEROLOSIS OF GALLBLADDER: ICD-10-CM

## 2025-03-12 DIAGNOSIS — K21.9 GASTRO-ESOPHAGEAL REFLUX DISEASE W/OUT ESOPHAGITIS: ICD-10-CM

## 2025-03-12 DIAGNOSIS — I85.10 SECONDARY ESOPHAGEAL VARICES W/OUT BLEEDING: ICD-10-CM

## 2025-03-12 DIAGNOSIS — K76.6 PORTAL HYPERTENSION: ICD-10-CM

## 2025-03-12 DIAGNOSIS — K74.60 UNSPECIFIED CIRRHOSIS OF LIVER: ICD-10-CM

## 2025-03-12 DIAGNOSIS — K31.89 PORTAL HYPERTENSION: ICD-10-CM

## 2025-03-12 DIAGNOSIS — Z86.0100 PERSONAL HISTORY OF COLON POLYPS, UNSPECIFIED: ICD-10-CM

## 2025-03-12 PROCEDURE — 99214 OFFICE O/P EST MOD 30 MIN: CPT

## 2025-03-12 RX ORDER — PANTOPRAZOLE SODIUM 40 MG/1
40 GRANULE, DELAYED RELEASE ORAL
Refills: 0 | Status: ACTIVE | COMMUNITY

## 2025-03-12 RX ORDER — FOLIC ACID 5 MG
5 CAPSULE ORAL
Refills: 0 | Status: ACTIVE | COMMUNITY

## 2025-03-13 ENCOUNTER — APPOINTMENT (OUTPATIENT)
Dept: ULTRASOUND IMAGING | Facility: CLINIC | Age: 70
End: 2025-03-13
Payer: MEDICARE

## 2025-03-13 ENCOUNTER — NON-APPOINTMENT (OUTPATIENT)
Age: 70
End: 2025-03-13

## 2025-03-13 LAB
AFP-TM SERPL-MCNC: 3.9 NG/ML
ALBUMIN SERPL ELPH-MCNC: 3.8 G/DL
ALP BLD-CCNC: 161 U/L
ALT SERPL-CCNC: 33 U/L
AMYLASE/CREAT SERPL: 192 U/L
ANION GAP SERPL CALC-SCNC: 10 MMOL/L
AST SERPL-CCNC: 41 U/L
BILIRUB SERPL-MCNC: 1 MG/DL
BUN SERPL-MCNC: 15 MG/DL
CALCIUM SERPL-MCNC: 9.4 MG/DL
CHLORIDE SERPL-SCNC: 105 MMOL/L
CO2 SERPL-SCNC: 23 MMOL/L
CREAT SERPL-MCNC: 0.72 MG/DL
EGFRCR SERPLBLD CKD-EPI 2021: 99 ML/MIN/1.73M2
ERYTHROCYTE [SEDIMENTATION RATE] IN BLOOD BY WESTERGREN METHOD: 10 MM/HR
FERRITIN SERPL-MCNC: 26 NG/ML
GGT SERPL-CCNC: 95 U/L
GLUCOSE SERPL-MCNC: 251 MG/DL
HCT VFR BLD CALC: 31.6 %
HGB BLD-MCNC: 10 G/DL
IRON SATN MFR SERPL: 8 %
IRON SERPL-MCNC: 28 UG/DL
LPL SERPL-CCNC: 101 U/L
MCHC RBC-ENTMCNC: 28.7 PG
MCHC RBC-ENTMCNC: 31.6 G/DL
MCV RBC AUTO: 90.5 FL
PLATELET # BLD AUTO: 150 K/UL
POTASSIUM SERPL-SCNC: 4.9 MMOL/L
PROT SERPL-MCNC: 7 G/DL
RBC # BLD: 3.49 M/UL
RBC # FLD: 15.7 %
SODIUM SERPL-SCNC: 138 MMOL/L
TIBC SERPL-MCNC: 354 UG/DL
UIBC SERPL-MCNC: 327 UG/DL
WBC # FLD AUTO: 4.55 K/UL

## 2025-03-13 PROCEDURE — 76700 US EXAM ABDOM COMPLETE: CPT

## 2025-03-17 ENCOUNTER — NON-APPOINTMENT (OUTPATIENT)
Age: 70
End: 2025-03-17

## 2025-04-22 ENCOUNTER — APPOINTMENT (OUTPATIENT)
Dept: GASTROENTEROLOGY | Facility: HOSPITAL | Age: 70
End: 2025-04-22

## 2025-04-22 ENCOUNTER — OUTPATIENT (OUTPATIENT)
Dept: OUTPATIENT SERVICES | Facility: HOSPITAL | Age: 70
LOS: 1 days | End: 2025-04-22
Payer: COMMERCIAL

## 2025-04-22 ENCOUNTER — TRANSCRIPTION ENCOUNTER (OUTPATIENT)
Age: 70
End: 2025-04-22

## 2025-04-22 VITALS
TEMPERATURE: 98 F | DIASTOLIC BLOOD PRESSURE: 68 MMHG | SYSTOLIC BLOOD PRESSURE: 113 MMHG | OXYGEN SATURATION: 97 % | HEIGHT: 67 IN | WEIGHT: 179.9 LBS | RESPIRATION RATE: 19 BRPM | HEART RATE: 59 BPM

## 2025-04-22 VITALS
OXYGEN SATURATION: 98 % | SYSTOLIC BLOOD PRESSURE: 106 MMHG | HEART RATE: 68 BPM | RESPIRATION RATE: 20 BRPM | DIASTOLIC BLOOD PRESSURE: 56 MMHG

## 2025-04-22 DIAGNOSIS — Z98.890 OTHER SPECIFIED POSTPROCEDURAL STATES: Chronic | ICD-10-CM

## 2025-04-22 DIAGNOSIS — I85.10 SECONDARY ESOPHAGEAL VARICES WITHOUT BLEEDING: ICD-10-CM

## 2025-04-22 LAB — GLUCOSE BLDC GLUCOMTR-MCNC: 147 MG/DL — HIGH (ref 70–99)

## 2025-04-22 PROCEDURE — 43235 EGD DIAGNOSTIC BRUSH WASH: CPT

## 2025-04-22 PROCEDURE — 82962 GLUCOSE BLOOD TEST: CPT

## 2025-04-22 RX ORDER — SODIUM CHLORIDE 9 G/1000ML
1000 INJECTION, SOLUTION INTRAVENOUS
Refills: 0 | Status: DISCONTINUED | OUTPATIENT
Start: 2025-04-22 | End: 2025-05-06

## 2025-04-22 RX ADMIN — Medication 30 MILLILITER(S): at 08:39

## 2025-04-22 NOTE — ASU DISCHARGE PLAN (ADULT/PEDIATRIC) - FINANCIAL ASSISTANCE
[facility] provides services at a reduced cost to those who are determined to be eligible through [facility]’s financial assistance program. Information regarding [facility]’s financial assistance program can be found by going to [email or web address] or by calling [phone].

## 2025-04-22 NOTE — ASU PATIENT PROFILE, ADULT - FALL HARM RISK - UNIVERSAL INTERVENTIONS
Bed in lowest position, wheels locked, appropriate side rails in place/Call bell, personal items and telephone in reach/Instruct patient to call for assistance before getting out of bed or chair/Non-slip footwear when patient is out of bed/[place] to call system/Physically safe environment - no spills, clutter or unnecessary equipment/Purposeful Proactive Rounding/Room/bathroom lighting operational, light cord in reach

## 2025-04-22 NOTE — CHART NOTE - NSCHARTNOTEFT_GEN_A_CORE
History of Present Illness       The patient is a 69-year-old  male with past medical history significant for hypertension, hypercholesterolemia, diabetes mellitus and ETOH liver cirrhosis being followed by his hepatologist, Dr. Jaci Mendoza who was referred to my office by Dr. Ezra Mckeon for evaluation for esophageal varices. The patient also admits to having dyspepsia, gastroesophageal reflux disease, diarrhea, change in bowel habits and change in caliber of stool. The patient also admits to a history of colonic polyps.   The patient has a history of liver disease. The patient has a history of fatty liver noted on prior imaging study. The patient denies any prior exposure to hepatitis A, B or C. The patient denies any large doses of nonsteroidal anti-inflammatory drugs or acetaminophen. The patient denies sharing needles, razors, nail clippers, nail files, scissors, et cetera. The patient admits to EtOH abuse but denies any cocaine use and intravenous drug use. The patient denies any prior sexual indiscretions, tattoos or piercing. The patient admits to having prior surgery. The history of surgery is significant for a prior bilateral hernia repair. The patient admits to a prior blood transfusion at [facility] after colon polypectomy in the past in 2020. The patient denies any family history of GI or liver problems. The patient states that he is feeling better. The patient was recently hospitalized at the Hospital in [facility] on February 25, 2025 for GI bleeding. The patient was hospitalized for 4 days for the symptoms. The blood work performed was unknown to the patient. The CAT scan of the abdomen and pelvis was also unknown to the patient. The upper endoscopy revealed esophageal varices requiring band ligation of the esophageal varices. The patient received 2 units of packed cells with improvement of the symptoms. The patient was observed for 4 days with resolution of the symptoms and was discharged to followup in the office. The patient was hospitalized in his native country, [facility] on February 25, 2025 for upper GI bleeding. The patient underwent an emergent upper endoscopy with band ligation for esophageal variceal bleed. The upper endoscopy revealed esophageal varices, portal gastropathy with congestion and erosions and active gastritis. The patient was treated with pantoprazole, folic acid, metoprolol, hydrochlorothiazide and Lasix for the symptoms. The patient was discharged after 4 days in stable condition and advised to follow-up in the office for further workup and treatment. The patient was treated with Spironolactone, Furosemide, Propranolol, pantoprazole and folic acid. He denies any ETOH abuse. The patient denies any jaundice or pruritus. The patient denies any chronic lower back pain. The patient denies any abdominal pain. The patient complains of abdominal gas and bloating. The patient previously complained of nausea and vomiting that resolved. He currently denies any nausea or vomiting. The patient complains of gastroesophageal reflux disease but denies any dysphagia. The gastroesophageal reflux disease is worse after meals and late at night and in the early morning. The gastroesophageal reflux disease is improved with proton pump inhibitors, H2 blockers and antacids. The patient denies any atypical chest pain, shortness of breath or palpitations. The patient denies any diaphoresis. The patient denies any constipation or diarrhea. The patient has 2 bowel movements a day. The patient denies a change in bowel habits. The patient denies a change in caliber of stool. The patient denies having mucus discharge with the bowel movements. The patient previously complained of melanotic stools that have since resolved. He currently denies any bright red blood per rectum, melena or hematemesis. The patient denies any rectal pain or rectal pruritus. The patient complains of weight loss but denies any anorexia. The patient admits to losing 10 pounds over the past 4 weeks. The patient attributes the weight loss to recent change in diet and recent hospitalization. He denies any fevers or chills. The patient had an upper endoscopy performed at the [facility] [place] GI endoscopy suite on October 22, 2024. The upper endoscopy was performed up to the level of the second portion of the duodenum. The upper endoscopy performed on October 22, 2024 revealed 5 cords of nonbleeding grade 2 esophageal varices starting at 30 cm from the incisor in the lower third of the esophagus varices, mild diffuse gastritis, mild portal gastric hypertension with no evidence of gastric varices. The esophageal varices required band ligation with 5 bands for the 5 cords of esophageal varices. There was no bleeding post band ligation. No biopsies were taken during the procedure. The patient tolerated the procedure well.  The abdominal ultrasound performed on March 13, 2025 revealed cirrhotic liver without focal abnormalities, splenomegaly and cholelithiasis without secondary signs of cholecystitis. Also noted were 7 mm mid right renal cyst. The blood work performed on March 12, 2025 revealed a normal alpha-fetoprotein level of 3.9 ng/mL, and elevated amylase/lipase level of 192/101 U/L, respectively, a normal WBC count of 4.55 K/UL, mild anemia with a hemoglobin/hematocrit level of 10.0/31.6, respectively, a normal platelet count of 150,000, and elevated blood glucose of 251 mg/dL, and elevated alkaline phosphatase/AST/GGTP of 161/41/95 E/L, respectively, a normal ALT of 33 U/L, a normal total bilirubin of 1.0 mg/dL, a low serum iron level of 28 mcg/dL, a normal TIBC/UIBC of 354/327 mcg/dL, respectively, a low iron percent saturation of 8%, a low ferritin level of 26 ng/mL, and a normal ESR of 10 mm/h.  The MRI of the abdomen with and without IV contrast performed on June 28, 2024 revealed limited evaluation of postcontrast images due to motion artifact, cirrhosis with evidence of portal hypertension in view of splenomegaly and perisplenic varices, no discrete suspicious hepatic lesions, cholelithiasis without evidence of cholecystitis and no evidence of choledocholithiasis, a tiny polyp in the gallbladder fundus, trace perihepatic ascites. There was a patent portal vein without thrombus. The abdominal ultrasound performed on March 25, 2024 revealed cirrhotic liver, mildly contracted gallbladder containing few small gallbladder polyps, and enlarged spleen small amount of free fluid seen adjacent to the liver. The blood work performed on Cheryl 3, 2024 revealed clumped platelets. The platelet count performed on Cheryl 3, 2024 was 86,000. The immunoglobulin panel performed on Cheryl 3, 2024 revealed a normal IgG level of 1575 mg/dL, a normal serum IgA level of 256 mg/dL, a normal serum IgM level of 88 mg/dL, a elevated immunoglobulin kappa FLC of 2.87 mg/dL, a normal IgA lambda FLC of 2.45 mg/dL and a normal kappa lambda FLC ratio of 1.17, a low CO2 of 19 mmol/L, and elevated blood glucose of 215 mg/dL, and elevated total bilirubin of 1.4 mg/dL, and elevated alkaline phosphatase/AST of 175/57 U/L, respectively, a normal ALT of 44 U/L, and elevated CA 19-9 of 54 U/mL, a normal CEA level 2.6 ng/mL, a normal alpha-fetoprotein level of 4.8 ng/mL, a normal PTT/INR of 12.8/1.14, respectively, a low WBC count of 3.50 K/UL, no evidence of anemia with a hemoglobin/hematocrit level of 13.1/39.9, respectively, a negative phosphatidyl ethanol level. The patient admits to having a prior colonoscopy performed by another gastroenterologist. According to the patient, the colonoscopic findings revealed colon polyps. The patient denies any significant family history of GI problems.  ?  (+) prior smoking 1 PPW x 30 years stopped x 4 years, (+) prior ETOH x 4 years, (-) IVDA  ?  Physical Exam:  ?  General Appearance: Overweight, no acute distress  ENT: nose clear, ears unremarkable  Eyes: No enteric sclera, conjunctiva clear.  Neck: Supple, without masses  Respiratory: Breath sounds equal and bilateral, no wheezing no rales or rhonchi  Cardiovascular: S1-S2 audible, no murmur, no rubs or gallops  GI: (+) BS, soft, nontender, no rebound, no guarding, no masses  Liver: liver edge palpated  Musculo-skeletal: Good motor strength, good range of motion, normal appearing extremities  Skin: Normal appearing skin, no jaundice, no rashes or nodules  Neurological: without focal motor or sensory deficits Patient is moving all extremities spontaneously and to command with normal muscle strength alert and oriented X3  Psychiatric: Good affect, not depressed, not anxious      Gastroenterology Summary    Upper Endoscopy: The upper endoscopy performed at the [facility] GI endoscopy suite on October 22, 2024 revealed 5 cords of nonbleeding grade 2 esophageal varices starting at 30 cm from the incisor in the lower third of the esophagus varices, mild diffuse gastritis, mild portal gastric hypertension with no evidence of gastric varices. The esophageal varices required band ligation with 5 bands for the 5 cords of esophageal varices. There was no bleeding post band ligation. No biopsies were taken during the procedure.      Radiology Summary    MRI: The MRI of the abdomen with and without IV contrast performed on June 28, 2024 revealed limited evaluation of postcontrast images due to motion artifact, cirrhosis with evidence of portal hypertension in view of splenomegaly and perisplenic varices, no discrete suspicious hepatic lesions, cholelithiasis without evidence of cholecystitis and no evidence of choledocholithiasis, a tiny polyp in the gallbladder fundus, trace perihepatic ascites. There was a patent portal vein without thrombus.    Abdominal Sono: The abdominal ultrasound performed on March 25, 2024 revealed cirrhotic liver, mildly contracted gallbladder containing few small gallbladder polyps, and enlarged spleen small amount of free fluid seen adjacent to the liver.  ?  Active Problems  Diarrhea, unspecified type (787.91) (R19.7)  Dyspepsia (536.8) (R10.13)  Gall bladder polyp (575.6) (K82.4)  GERD without esophagitis (530.81) (K21.9)  Hepatic cirrhosis, unspecified hepatic cirrhosis type, unspecified whether ascites  present (571.5) (K74.60)  History of colon polyps (V12.72) (Z86.0100)  Hyperlipidemia, unspecified hyperlipidemia type (272.4) (E78.5)  Lung disease (518.89) (J98.4)  Portal hypertensive gastropathy (572.3,537.89) (K76.6,K31.89)  Positive KARISHMA (antinuclear antibody) (795.79) (R76.8)  Secondary esophageal varices without bleeding (456.21) (I85.10)  Thrombocytopenia (287.5) (D69.6)  Type 2 diabetes mellitus without complication, without long-term current use of insulin  (250.00) (E11.9)           ?  Current Meds  Carvedilol 3.125 MG Oral Tablet; TAKE 1 TABLET TWICE DAILY WITH MEALS  Famotidine 40 MG Oral Tablet; TOME 1 TABLETA POR VIA ORAL DOS VECES AL TIN  Folic Acid 5 MG Oral Capsule  Glimepiride 4 MG Oral Tablet  Lisinopril 5 MG Oral Tablet; TAKE 0.5 TABLET  metFORMIN HCl - 1000 MG Oral Tablet  Pantoprazole Sodium 40 MG Oral Packet  Simethicone 125 MG Oral Tablet Chewable; CHEW AND SWALLOW 1 TABLET 4 TIMES  DAILY, AFTER MEALS AND AT BEDTIME  Simvastatin 5 MG Oral Tablet           Allergies  No Known Drug Allergies           ?  Vitals  Vital Signs  ?  Recorded: 12Mar2025 10:29AM  Systolic  119, LUE, Sitting  Diastolic  69, LUE, Sitting  Height  5 ft 7 in  Weight  172 lb  BMI Calculated  26.94 kg/m2  BSA Calculated  1.9 m2  Temperature  97.1 F, Temporal  Heart Rate  70  O2 Saturation  97 %, Room Air  FiO2 Flow Rate  0 L/min, Room Air           ?  Assessment  Hepatic cirrhosis, unspecified hepatic cirrhosis type, unspecified whether ascites  present (571.5) (K74.60)  Secondary esophageal varices without bleeding (456.21) (I85.10)  Thrombocytopenia (287.5) (D69.6)  Portal hypertensive gastropathy (572.3,537.89) (K76.6,K31.89)  History of colon polyps (V12.72) (Z86.0100)  GERD without esophagitis (530.81) (K21.9)  Dyspepsia (536.8) (R10.13)  Gall bladder polyp (575.6) (K82.4)    Dyspepsia: The patient complains of dyspeptic symptoms. The patient was advised to continue to abide by an anti-gas (low FOD-MAP) diet. The patient was previously given a pamphlet for anti-gas (low FOD-MAP). The patient and I reviewed the anti-gas (low FOD-MAP) diet at length again. The patient is to continue on a trial of Simethicone one tablet 4 times a day p.r.n. abdominal pain and gas.  GERD: The patient was advised to avoid late-night meals and dietary indiscretions. The patient was advised to avoid fried and fatty foods. The patient was advised to abide by an anti-GERD diet. The patient was given a pamphlet for anti-GERD. The patient and I reviewed the anti-GERD diet at length. I recommend a trial of Pantoprazole 40 mg once a day x 3 months for the symptoms.  History of GI Bleeding: The patient was hospitalized in his native country, [facility] on February 25, 2025 for upper GI bleeding. The patient underwent an emergent upper endoscopy with band ligation for esophageal variceal bleed. The upper endoscopy revealed esophageal varices, portal gastropathy with congestion and erosions and active gastritis. The patient was treated with pantoprazole, folic acid, metoprolol, hydrochlorothiazide and Lasix for the symptoms. The patient was discharged after 4 days in stable condition and advised to follow-up in the office for further workup and treatment.  Prior Hospitalization: The patient was previously hospitalized at the [facility] in [facility] for variceal bleeding. The patient had blood work and imaging studies to assess the symptoms. I will try to obtain the hospital records, blood work and imaging studies performed in the hospital. The patient is to sign a record release to obtain those records. The patient [place],  on February 25, 2025 for upper GI bleeding. The patient underwent an emergent upper endoscopy with band ligation for esophageal variceal bleed. The upper endoscopy revealed esophageal varices, portal gastropathy with congestion and erosions and active gastritis. The patient was treated with pantoprazole, folic acid, metoprolol, hydrochlorothiazide and Lasix for the symptoms. The patient was discharged after 4 days in stable condition and advised to follow-up in the office for further workup and treatment.  History of ETOH Liver Cirrhosis: The patient had a history of ETOH abuse. The patient denies any further alcohol use. The patient denies any jaundice or pruritus. The patient denies taking large doses of nonsteroidal anti-inflammatory drugs or acetaminophen. The findings are secondary to EtOH liver cirrhosis. The patient was told of the possible increased risk of developing liver failure, worsening cirrhosis, ascites, GI bleeding secondary to varices, hepatic encephalopathy, bleeding tendencies and liver cancer. The patient was told of the importance of follow-up. The patient was advised to follow up every 6 months for blood work and imaging studies. The patient agreed and will follow up. The patient was also advised to avoid NSAIDs, Acetaminophen and any other hepatotoxic drugs. The patient was also advised not to share needles, razors, scissors, nail clippers, etc.. The patient is to continue close follow-up in our office for blood work and exams. The patient and I had a long discussion regarding the importance of abstinence of alcohol because of the risk of cirrhosis and complications. The patient is aware of the risks of further progression of liver disease while drinking alcohol or taking hepato-toxic agents. The patient agrees and will continue to abstain from alcohol. The patient is to continue followup with his hepatologist Dr. Mendoza.  Esophageal Varices: The patient was found to have findings on imaging study was suggestive of liver cirrhosis. I recommend a repeat upper endoscopy to reassess for esophageal varices and possible repeat esophageal band ligation. The patient was told of the increased risk of esophageal variceal bleeding in the future. I recommend an upper endoscopy to assess the esophageal varices for possible band ligation. The patient was told of the risks and benefits of the procedure. The patient was told of the risks of perforation, emergency surgery, bleeding, infections and missed lesions. The patient agreed and will schedule for procedure. The patient is to be n.p.o. after midnight. I also recommend a prophylactic dose of Cipro 500 mg one hour prior to the procedure and 6 hours after the procedure. The patient is to return for the procedure.  Abnormal Imaging Study: The MRI of the abdomen with and without IV contrast performed on June 28, 2024 revealed limited evaluation of postcontrast images due to motion artifact, cirrhosis with evidence of portal hypertension in view of splenomegaly and perisplenic varices, no discrete suspicious hepatic lesions, cholelithiasis without evidence of cholecystitis and no evidence of choledocholithiasis, a tiny polyp in the gallbladder fundus, trace perihepatic ascites. There was a patent portal vein without thrombus. The abdominal ultrasound performed on March 25, 2024 revealed cirrhotic liver, mildly contracted gallbladder containing few small gallbladder polyps, and enlarged spleen small amount of free fluid seen adjacent to the liver.  Gallbladder Polyps: The patient has gallbladder polyps noted on abdominal ultrasound. The patient and I had a long discussion regarding the risks of gallbladder polyps progressing to gallbladder cancer if the size is greater than 1cm. The patient was told of the importance for follow-up for serial abdominal ultrasounds. The patient was told of the possible need for a cholecystectomy if the gallbladder polyps are greater than 1cm. The patient agreed and will follow-up. I recommend a repeat abdominal ultrasound in 6 months to reassess the gallbladder polyp size.  History of Colonic Polyps: The patient has a history of colonic polyps. I recommend a repeat colonoscopy in 1 year to reassess for colonic polyps unless symptomatic. The patient agreed and will follow up for the procedure.  Upper Endoscopy: I recommend a repeat upper endoscopy to reassess for esophageal varices for possible repeat band ligation. The patient was told of the risks and benefits of the procedure. The patient was told of the risks of perforation, emergency surgery, bleeding, infections and missed lesions. The patient is told not to drive, drink alcohol, use recreational drugs, exercise, or work the day of the procedure. The patient was told of the need for an escort to accompany the patient home after the procedure. The patient is aware that the procedure may be cancelled if they fail to follow the directions. The patient agreed and will schedule for the procedure. The patient can take the antihypertensive medication with a sip of water one hour prior to the procedure. The patient is to hold the diabetic medication the day the morning of the procedure. The patient is to be n.p.o. after midnight. The patient is to return for the procedure.  Thrombocytopenia: The patient was found to have thrombocytopenia on recent blood work. The thrombocytopenia is most likely secondary to cirrhosis and splenomegaly. The patient denies any active GI bleeding. I recommend following the platelet count and treat accordingly.  Imaging Study: I recommend an imaging study to assess the symptoms. I recommend an abdominal ultrasound to assess the liver parenchyma and for liver lesions and assess for ascites.  Blood Work: I recommend blood work to assess the patient's symptoms. I recommend a CBC, SMA 24, amylase, lipase, ESR, AFP, iron, TIBC, ferritin level. I also recommend obtaining the recent blood work performed by the patient's PMD.  Follow-up: The patient is to follow-up in the office in 4 weeks to reassess the symptoms. The patient was told to call the office if any further problems.            ?  Plan  Hepatic cirrhosis, unspecified hepatic cirrhosis type, unspecified whether ascites  present  Alpha Fetoprotein - Tumor Marker; Status:Active; Requested for:12Mar2025;  Amylase; Status:Active; Requested for:12Mar2025;  Complete Blood Count; Status:Active; Requested for:12Mar2025;  Comprehensive Metabolic Panel; Status:Active; Requested for:12Mar2025;  Ferritin; Status:Active; Requested for:12Mar2025;  Gamma Glutamyl Transferase, Serum; Status:Active; Requested for:12Mar2025;  Iron with Total Binding Capacity; Status:Active; Requested for:12Mar2025;  Lipase; Status:Active; Requested for:12Mar2025;  Sedimentation Rate, Erythrocyte; Status:Active; Requested for:12Mar2025;  US Abdomen Complete; Status:Active; Requested for:12Mar2025;  Secondary esophageal varices without bleeding  Upper GI Endoscopy; Status:Active; Requested for:12Mar2025;           ?

## 2025-05-07 ENCOUNTER — APPOINTMENT (OUTPATIENT)
Dept: GASTROENTEROLOGY | Facility: CLINIC | Age: 70
End: 2025-05-07
Payer: MEDICARE

## 2025-05-07 VITALS
WEIGHT: 186 LBS | HEIGHT: 67 IN | BODY MASS INDEX: 29.19 KG/M2 | TEMPERATURE: 97.5 F | DIASTOLIC BLOOD PRESSURE: 65 MMHG | OXYGEN SATURATION: 97 % | HEART RATE: 63 BPM | SYSTOLIC BLOOD PRESSURE: 137 MMHG

## 2025-05-07 DIAGNOSIS — K21.9 GASTRO-ESOPHAGEAL REFLUX DISEASE W/OUT ESOPHAGITIS: ICD-10-CM

## 2025-05-07 DIAGNOSIS — Z86.0100 PERSONAL HISTORY OF COLON POLYPS, UNSPECIFIED: ICD-10-CM

## 2025-05-07 DIAGNOSIS — I85.10 SECONDARY ESOPHAGEAL VARICES W/OUT BLEEDING: ICD-10-CM

## 2025-05-07 DIAGNOSIS — K76.6 PORTAL HYPERTENSION: ICD-10-CM

## 2025-05-07 DIAGNOSIS — R10.13 EPIGASTRIC PAIN: ICD-10-CM

## 2025-05-07 DIAGNOSIS — K31.89 PORTAL HYPERTENSION: ICD-10-CM

## 2025-05-07 DIAGNOSIS — K74.60 UNSPECIFIED CIRRHOSIS OF LIVER: ICD-10-CM

## 2025-05-07 DIAGNOSIS — K82.4 CHOLESTEROLOSIS OF GALLBLADDER: ICD-10-CM

## 2025-05-07 PROCEDURE — 99213 OFFICE O/P EST LOW 20 MIN: CPT

## 2025-05-07 RX ORDER — PANTOPRAZOLE 40 MG/1
40 TABLET, DELAYED RELEASE ORAL DAILY
Qty: 30 | Refills: 3 | Status: ACTIVE | COMMUNITY
Start: 2025-05-07 | End: 1900-01-01

## 2025-05-07 RX ORDER — SIMETHICONE 125 MG/1
125 TABLET, CHEWABLE ORAL
Qty: 120 | Refills: 3 | Status: ACTIVE | COMMUNITY
Start: 2025-05-07 | End: 1900-01-01

## 2025-06-03 ENCOUNTER — APPOINTMENT (OUTPATIENT)
Dept: HEPATOLOGY | Facility: CLINIC | Age: 70
End: 2025-06-03
Payer: MEDICARE

## 2025-06-03 VITALS
SYSTOLIC BLOOD PRESSURE: 122 MMHG | BODY MASS INDEX: 28.51 KG/M2 | HEART RATE: 67 BPM | TEMPERATURE: 97.4 F | WEIGHT: 182 LBS | RESPIRATION RATE: 16 BRPM | DIASTOLIC BLOOD PRESSURE: 66 MMHG | OXYGEN SATURATION: 98 %

## 2025-06-03 DIAGNOSIS — D69.6 THROMBOCYTOPENIA, UNSPECIFIED: ICD-10-CM

## 2025-06-03 DIAGNOSIS — R76.8 OTHER SPECIFIED ABNORMAL IMMUNOLOGICAL FINDINGS IN SERUM: ICD-10-CM

## 2025-06-03 DIAGNOSIS — E11.9 TYPE 2 DIABETES MELLITUS W/OUT COMPLICATIONS: ICD-10-CM

## 2025-06-03 DIAGNOSIS — K82.4 CHOLESTEROLOSIS OF GALLBLADDER: ICD-10-CM

## 2025-06-03 DIAGNOSIS — K76.6 PORTAL HYPERTENSION: ICD-10-CM

## 2025-06-03 DIAGNOSIS — K74.60 UNSPECIFIED CIRRHOSIS OF LIVER: ICD-10-CM

## 2025-06-03 DIAGNOSIS — I85.10 SECONDARY ESOPHAGEAL VARICES W/OUT BLEEDING: ICD-10-CM

## 2025-06-03 DIAGNOSIS — K31.89 PORTAL HYPERTENSION: ICD-10-CM

## 2025-06-03 LAB
25(OH)D3 SERPL-MCNC: 41.4 NG/ML
AFP-TM SERPL-MCNC: 3.6 NG/ML
ALBUMIN SERPL ELPH-MCNC: 3.8 G/DL
ALP BLD-CCNC: 239 U/L
ALT SERPL-CCNC: 48 U/L
ANION GAP SERPL CALC-SCNC: 15 MMOL/L
AST SERPL-CCNC: 57 U/L
BILIRUB DIRECT SERPL-MCNC: 0.39 MG/DL
BILIRUB INDIRECT SERPL-MCNC: 0.5 MG/DL
BILIRUB SERPL-MCNC: 0.9 MG/DL
BUN SERPL-MCNC: 12 MG/DL
CALCIUM SERPL-MCNC: 9.3 MG/DL
CHLORIDE SERPL-SCNC: 106 MMOL/L
CO2 SERPL-SCNC: 20 MMOL/L
CREAT SERPL-MCNC: 0.54 MG/DL
EGFRCR SERPLBLD CKD-EPI 2021: 108 ML/MIN/1.73M2
GLUCOSE SERPL-MCNC: 196 MG/DL
INR PPP: 1.08 RATIO
POTASSIUM SERPL-SCNC: 4.5 MMOL/L
PROT SERPL-MCNC: 7.3 G/DL
PT BLD: 12.8 SEC
SODIUM SERPL-SCNC: 141 MMOL/L

## 2025-06-03 PROCEDURE — 99214 OFFICE O/P EST MOD 30 MIN: CPT

## 2025-06-04 LAB
ESTIMATED AVERAGE GLUCOSE: 171 MG/DL
HBA1C MFR BLD HPLC: 7.6 %
HCT VFR BLD CALC: 40.2 %
HGB BLD-MCNC: 12.6 G/DL
MCHC RBC-ENTMCNC: 27.3 PG
MCHC RBC-ENTMCNC: 31.3 G/DL
MCV RBC AUTO: 87 FL
PLATELET # BLD AUTO: 96 K/UL
RBC # BLD: 4.62 M/UL
RBC # FLD: 18.8 %
WBC # FLD AUTO: 3.73 K/UL

## 2025-06-06 LAB
PETH 16:0/18:1: NEGATIVE NG/ML
PETH 16:0/18:2: NEGATIVE NG/ML
PETH COMMENTS: NORMAL

## 2025-06-12 ENCOUNTER — RX RENEWAL (OUTPATIENT)
Age: 70
End: 2025-06-12

## 2025-06-17 ENCOUNTER — APPOINTMENT (OUTPATIENT)
Dept: MRI IMAGING | Facility: CLINIC | Age: 70
End: 2025-06-17

## 2025-06-17 ENCOUNTER — APPOINTMENT (OUTPATIENT)
Dept: RADIOLOGY | Facility: CLINIC | Age: 70
End: 2025-06-17

## 2025-06-17 PROCEDURE — 77085 DXA BONE DENSITY AXL VRT FX: CPT

## 2025-06-17 PROCEDURE — 74183 MRI ABD W/O CNTR FLWD CNTR: CPT

## 2025-06-17 PROCEDURE — A9585: CPT | Mod: JW

## (undated) DEVICE — VENODYNE/SCD SLEEVE CALF MEDIUM

## (undated) DEVICE — VALVE ENDO SURESEAL II 0-5FR

## (undated) DEVICE — FORCEP RADIAL JAW 4 W NDL 2.2MM 2.8MM 240CM ORANGE DISP

## (undated) DEVICE — KIT ENDO PROCEDURE CUST 10/BX

## (undated) DEVICE — BITE BLOCK ADULT 20 X 27MM (GREEN)

## (undated) DEVICE — SOLIDIFIER 1200CC

## (undated) DEVICE — SOL INJ NS 0.9% 500ML 1-PORT

## (undated) DEVICE — SOLIDIFIER ISOLYZER 2000 CC

## (undated) DEVICE — FORMALIN CUPS 10% BUFFERED

## (undated) DEVICE — BIOPSY FORCEP RADIAL JAW 4 STANDARD WITH NEEDLE

## (undated) DEVICE — TUBING CANNULA SALTER LABS NASAL ADULT 7FT

## (undated) DEVICE — CONTAINER FORMALIN 10% 20ML

## (undated) DEVICE — MASK O2 ADLT POM ELITE W/ MED AND HI CONCEN M TO M 10FT

## (undated) DEVICE — TUBING ENDO EXT OLYMPUS 160 24HR USE GI

## (undated) DEVICE — MASK O2 MICROSTREAM SAMPLE LINE MED/LRG 10FT

## (undated) DEVICE — Device